# Patient Record
Sex: FEMALE | Race: WHITE | NOT HISPANIC OR LATINO | URBAN - METROPOLITAN AREA
[De-identification: names, ages, dates, MRNs, and addresses within clinical notes are randomized per-mention and may not be internally consistent; named-entity substitution may affect disease eponyms.]

---

## 2022-01-24 ENCOUNTER — APPOINTMENT (OUTPATIENT)
Dept: URBAN - METROPOLITAN AREA CLINIC 211 | Age: 42
Setting detail: DERMATOLOGY
End: 2022-01-26

## 2022-01-24 ENCOUNTER — APPOINTMENT (OUTPATIENT)
Dept: URBAN - METROPOLITAN AREA CLINIC 211 | Age: 42
Setting detail: DERMATOLOGY
End: 2022-01-24

## 2022-01-24 DIAGNOSIS — Z71.89 OTHER SPECIFIED COUNSELING: ICD-10-CM

## 2022-01-24 DIAGNOSIS — Z41.9 ENCOUNTER FOR PROCEDURE FOR PURPOSES OTHER THAN REMEDYING HEALTH STATE, UNSPECIFIED: ICD-10-CM

## 2022-01-24 PROCEDURE — OTHER MIPS QUALITY: OTHER

## 2022-01-24 PROCEDURE — OTHER OTHER (COSMETIC): OTHER

## 2022-01-24 PROCEDURE — OTHER TREATMENT REGIMEN: OTHER

## 2022-01-24 PROCEDURE — OTHER COUNSELING: OTHER

## 2022-01-24 ASSESSMENT — LOCATION DETAILED DESCRIPTION DERM
LOCATION DETAILED: INFERIOR MID FOREHEAD
LOCATION DETAILED: LEFT INFERIOR FOREHEAD

## 2022-01-24 ASSESSMENT — LOCATION ZONE DERM: LOCATION ZONE: FACE

## 2022-01-24 ASSESSMENT — LOCATION SIMPLE DESCRIPTION DERM
LOCATION SIMPLE: LEFT FOREHEAD
LOCATION SIMPLE: INFERIOR FOREHEAD

## 2022-01-24 NOTE — HPI: OTHER
Condition:: Cosmetic
Please Describe Your Condition:: Pt presents for a consultation for skin care, dermaplaning and chemical peels.  Pt states that her skin is really dry.  Pt is currently just washing face in shower and using OTC lotion on face.  Medications, allergies and medical hx were reviewed.

## 2022-01-24 NOTE — PROCEDURE: OTHER (COSMETIC)
Detail Level: Zone
Other (Free Text): Cosmetic Consultation\\nPE:dry, dull skin, \\n\\nPLAN:\\n\\n1.  Rec Obagi Gentle Cleanser to was face bid followed by Obagi Toner, and Obagi C-Rx day lotion ff bid.  The products were reviewed in detail, r&b and rationale was provided.  Samples were provided of the Day lotion.\\n\\n2.  Spoke about starting with the [place] Berry Facial, Ericka Glow.  A handout was given with the pricing of the facials.  Discussed that these can help enhance her at home skin care regimen.\\n\\n3.  Rec Ashley Brightening Polish to was over her body to help exfoliate the dead damaged skin cells on the body.  The product was reviewed in detail and rationale was provided.\\n\\nNOTE:\\n\\Jun indications, treatment expectations (including management of any possible irritations), protocols, risks and benefits, pre/post care are reviewed.  Patient understands that multiple treatments may be necessary for optimum results and that on going maintenance with at-home products and additional office visits or treatments may be needed to enhance and extend the desired results.\\n\\Jun questions were addressed.\\n\\nRTC-TBD for a facial and 6-8 week skin care follow up

## 2022-01-24 NOTE — PROCEDURE: OTHER (COSMETIC)
Other (Free Text): Patient seen today for cosmetic consult
Detail Level: Zone
Price (Use Numbers Only, No Special Characters Or $): 0.00

## 2022-01-24 NOTE — PROCEDURE: MIPS QUALITY
Quality 431: Preventive Care And Screening: Unhealthy Alcohol Use - Screening: Patient not identified as an unhealthy alcohol user when screened for unhealthy alcohol use using a systematic screening method
Quality 110: Preventive Care And Screening: Influenza Immunization: Influenza Immunization previously received during influenza season
Detail Level: Detailed
Quality 226: Preventive Care And Screening: Tobacco Use: Screening And Cessation Intervention: Patient screened for tobacco use and is an ex/non-smoker
Quality 130: Documentation Of Current Medications In The Medical Record: Current Medications Documented
Quality 431: Preventive Care And Screening: Unhealthy Alcohol Use - Screening: Patient screened for unhealthy alcohol use using a single question and scores less than 2 times per year

## 2022-05-09 ENCOUNTER — APPOINTMENT (OUTPATIENT)
Dept: URBAN - METROPOLITAN AREA CLINIC 211 | Age: 42
Setting detail: DERMATOLOGY
End: 2022-05-09

## 2022-05-09 DIAGNOSIS — Z41.9 ENCOUNTER FOR PROCEDURE FOR PURPOSES OTHER THAN REMEDYING HEALTH STATE, UNSPECIFIED: ICD-10-CM

## 2022-05-09 PROCEDURE — OTHER OTHER (COSMETIC): OTHER

## 2022-05-09 PROCEDURE — OTHER MIPS QUALITY: OTHER

## 2022-05-09 ASSESSMENT — LOCATION ZONE DERM: LOCATION ZONE: FACE

## 2022-05-09 ASSESSMENT — LOCATION SIMPLE DESCRIPTION DERM: LOCATION SIMPLE: LEFT CHEEK

## 2022-05-09 ASSESSMENT — LOCATION DETAILED DESCRIPTION DERM: LOCATION DETAILED: LEFT INFERIOR CENTRAL MALAR CHEEK

## 2022-05-09 NOTE — PROCEDURE: OTHER (COSMETIC)
Other (Free Text): Cosmetic\\n\\nPE:\\n\\nPrior to treatment, all but not limited to treatment indications and expectations(including management of any possible irritations) protocols, risks and benefits were reviewed in detail, including post treatment expectations. All questions were answered prior to administering the treatment.\\n\\nPlan: DermaSilk - Dermaplaning is performed first followed by The Ericka Glow.\\n\\nTreatment #:1/1\\nTreatment Site:full face\\n\\nCleanse:\\n\\nAcetone:Yes\\n\\nDermaplaning:\\n\\nApplied per protocol utilizing a #10 blade at a 45-degree angle and with a gentle stroking motion.\\n\\nDiamond Glow Specifics:\\nSelected Serum:\\nLot#:\\nExp:\\nGrit Size:\\nApprox PSI:\\n\\nFollowed by Cherry Enzyme Peel occluded with a warm estrada towel. Once removed a Hydrotant Marine Mask is applied with a warm estrada towel. Each mask is gently massaged on and removed with in a few minutes or as tolerated.\\n\\nProducts applied:\\n\\nNotes:\\n\\nStandard protocol was applied. All indications, treatment expectations (including management of any possible irritations), protocols, risks and benefits, pre/post care are reviewed. Details of these can be found on the appropriate attached informed consent documentation. Patient understands that multiple treatments may be necessary for optimum results and that on going maintenance with at-home products and additional office visits or treatments may be needed to enhance and extend the desired results. Post care was reviewed.\\n\\Jun questions questions were addressed.\\n\\nRTC:
Detail Level: Zone

## 2022-05-09 NOTE — HPI: OTHER
Condition:: Cosmetic
Please Describe Your Condition:: Patient presents for the Dermasilk facial.  Pt is concerned about her enlarged pores, congestion and dull skin.  Pt has just started about 2-3 weeks ago the Obagi Gentle Cleanser, a toner she has at home and the Obagi C-rx Day Lotion.  Medications, allergies and medical hx were reviewed.

## 2022-05-09 NOTE — PROCEDURE: MIPS QUALITY
Quality 226: Preventive Care And Screening: Tobacco Use: Screening And Cessation Intervention: Patient screened for tobacco use and is an ex/non-smoker
Quality 110: Preventive Care And Screening: Influenza Immunization: Influenza Immunization previously received during influenza season
Detail Level: Detailed
Quality 431: Preventive Care And Screening: Unhealthy Alcohol Use - Screening: Patient not identified as an unhealthy alcohol user when screened for unhealthy alcohol use using a systematic screening method
Quality 130: Documentation Of Current Medications In The Medical Record: Current Medications Documented
Quality 431: Preventive Care And Screening: Unhealthy Alcohol Use - Screening: Patient screened for unhealthy alcohol use using a single question and scores less than 2 times per year

## 2022-05-10 ENCOUNTER — APPOINTMENT (OUTPATIENT)
Dept: URBAN - METROPOLITAN AREA CLINIC 212 | Age: 42
Setting detail: DERMATOLOGY
End: 2022-05-10

## 2022-05-10 DIAGNOSIS — Z41.9 ENCOUNTER FOR PROCEDURE FOR PURPOSES OTHER THAN REMEDYING HEALTH STATE, UNSPECIFIED: ICD-10-CM

## 2022-05-10 PROCEDURE — OTHER DERMAPLANE: OTHER

## 2022-05-10 PROCEDURE — OTHER MIPS QUALITY: OTHER

## 2022-05-10 ASSESSMENT — LOCATION DETAILED DESCRIPTION DERM: LOCATION DETAILED: LEFT MEDIAL MALAR CHEEK

## 2022-05-10 ASSESSMENT — LOCATION SIMPLE DESCRIPTION DERM: LOCATION SIMPLE: LEFT CHEEK

## 2022-05-10 ASSESSMENT — LOCATION ZONE DERM: LOCATION ZONE: FACE

## 2022-05-10 NOTE — PROCEDURE: MIPS QUALITY
Quality 110: Preventive Care And Screening: Influenza Immunization: Influenza Immunization previously received during influenza season
Quality 226: Preventive Care And Screening: Tobacco Use: Screening And Cessation Intervention: Patient screened for tobacco use and is an ex/non-smoker
Quality 431: Preventive Care And Screening: Unhealthy Alcohol Use - Screening: Patient screened for unhealthy alcohol use using a single question and scores less than 2 times per year
Quality 431: Preventive Care And Screening: Unhealthy Alcohol Use - Screening: Patient not identified as an unhealthy alcohol user when screened for unhealthy alcohol use using a systematic screening method
Quality 130: Documentation Of Current Medications In The Medical Record: Current Medications Documented
Detail Level: Detailed

## 2022-05-10 NOTE — HPI: OTHER
Condition:: Cosmetic
Please Describe Your Condition:: is an established patient who is being seen for a chief complaint of Cosmetic. Patient presents for dermaplaning touch up. She was seen yesterday for a DermaSilk facial and feels there are some hairs missed with dermaplaning. She confirms no other concerns and is pleased with the softness of her complexion following yesterdays tx. \\n\\n All medications, allergies, and medical history were reviewed.

## 2022-05-10 NOTE — PROCEDURE: DERMAPLANE
Treatment Areas: face
Post-Procedure Instructions: Following the dermaplane procedure, Oxymist treatment was applied to the treatment areas. Moisturizer and SPF was applied.
Pre-Procedure Text: The patient was placed in a recumbant position on the procedure table.
Treatment Area Prep: acetone
Blade: 10 blade scalpel
Detail Level: Zone
Post-Care Instructions: I reviewed with the patient in detail post-care instructions.